# Patient Record
Sex: MALE | Race: WHITE
[De-identification: names, ages, dates, MRNs, and addresses within clinical notes are randomized per-mention and may not be internally consistent; named-entity substitution may affect disease eponyms.]

---

## 2021-01-11 ENCOUNTER — HOSPITAL ENCOUNTER (EMERGENCY)
Dept: HOSPITAL 7 - FB.ED | Age: 21
Discharge: HOME | End: 2021-01-11
Payer: COMMERCIAL
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
## 2021-01-11 DIAGNOSIS — S61.412A: Primary | ICD-10-CM

## 2021-01-11 PROCEDURE — 99282 EMERGENCY DEPT VISIT SF MDM: CPT

## 2021-01-11 PROCEDURE — 12001 RPR S/N/AX/GEN/TRNK 2.5CM/<: CPT

## 2021-01-11 NOTE — EDM.PDOC
ED HPI GENERAL MEDICAL PROBLEM





- General


Chief Complaint: Laceration


Stated Complaint: CUT FINGER


Time Seen by Provider: 01/11/21 17:40


Source of Information: Reports: Patient


History Limitations: Reports: No Limitations





- History of Present Illness


INITIAL COMMENTS - FREE TEXT/NARRATIVE: 





c/o hand lac





in his dorm, cutting twist tie with pocket knife, struck L hand





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 





from [employer]


  ** Left Finger-Index


Pain Score (Numeric/FACES): 3





- Related Data


                                    Allergies











Allergy/AdvReac Type Severity Reaction Status Date / Time


 


No Known Allergies Allergy   Verified 01/11/21 17:34











Home Meds: 


                                    Home Meds





cephALEXin [Cephalexin] 500 mg PO BID #6 tablet 01/11/21 [Rx]











Past Medical History





- Past Health History


Medical/Surgical History: Denies Medical/Surgical History





- Past Surgical History


HEENT Surgical History: Reports: Oral Surgery


Other HEENT Surgeries/Procedures: wisdom teeth removed.





Social & Family History





- Tobacco Use


Tobacco Use Status *Q: Never Tobacco User





- Caffeine Use


Caffeine Use: Reports: Soda





- Recreational Drug Use


Recreational Drug Use: No





ED ROS GENERAL





- Review of Systems


Review Of Systems: See Below


Constitutional: Reports: No Symptoms


HEENT: Reports: No Symptoms


Respiratory: Reports: No Symptoms


Cardiovascular: Reports: No Symptoms


Endocrine: Reports: No Symptoms


GI/Abdominal: Reports: No Symptoms


: Reports: No Symptoms


Musculoskeletal: Reports: No Symptoms


Skin: Reports: Wound


Neurological: Reports: No Symptoms


Psychiatric: Reports: No Symptoms


Hematologic/Lymphatic: Reports: No Symptoms


Immunologic: Reports: No Symptoms





ED EXAM, SKIN/RASH


Exam: See Below


Exam Limited By: No Limitations


General Appearance: Alert, WD/WN, No Apparent Distress


Head: Atraumatic, Normocephalic


Neck: Normal Inspection, Supple, Non-Tender, Full Range of Motion.  No: 

Lymphadenopathy (R), Lymphadenopathy (L)


Respiratory/Chest: No Respiratory Distress, Lungs Clear, Normal Breath Sounds, 

Chest Non-Tender


Cardiovascular: Regular Rate, Rhythm, No Edema, No Murmur


GI/Abdominal: Soft, Non-Tender


Back Exam: Normal Inspection, Full Range of Motion.  No: CVA Tenderness (L)


Neurological: Alert, Oriented, CN II-XII Intact, Normal Cognition, Normal Gait, 

No Motor/Sensory Deficits


Psychiatric: Normal Affect, Normal Mood


Skin: Warm, Dry, Other (L hand with superficial 1.3 cm lac over 2nd MCP, FROM, 

sensation intact, full thickness, no apparent injury to joint capsule, 1% lido 

with epi with #30 needle local, 3-0 Prolene x 4 closure after clean x 12 with 

gauze and NS, good analgesia, good apposition of margins, no tendons injured)





Course





- Vital Signs


Last Recorded V/S: 





                                Last Vital Signs











Temp  36.7 C   01/11/21 17:34


 


Pulse  76   01/11/21 17:34


 


Resp  18   01/11/21 17:34


 


BP  151/87 H  01/11/21 17:34


 


Pulse Ox  100   01/11/21 17:34














- Orders/Labs/Meds


Orders: 





                               Active Orders 24 hr











 Category Date Time Status


 


 cephALEXin [Keflex] Med  01/11/21 18:23 Once





 500 mg PO ONETIME ONE   














- Re-Assessments/Exams


Free Text/Narrative Re-Assessment/Exam: 





01/11/21 18:35


lac appeared angulated and just FT, pt cautioned that ortho would need to 

irrigate joint if signs of inf occurred, which is unlikely, pt agreed to follow 

instructions and monitor closely





Departure





- Departure


Time of Disposition: 18:24


Disposition: Home, Self-Care 01


Condition: Good


Clinical Impression: 


 Laceration of left hand








- Discharge Information


*PRESCRIPTION DRUG MONITORING PROGRAM REVIEWED*: Not Applicable


*COPY OF PRESCRIPTION DRUG MONITORING REPORT IN PATIENT [facility]AYA: Not Applicable


Prescriptions: 


cephALEXin [Cephalexin] 500 mg PO BID #6 tablet


Instructions:  Sutured Wound Care


Referrals: 


PCP,None [Primary Care Provider] - 


Additional Instructions: 


The risk of infection is low. However, see a physician the same day for any 

increase in redness, swelling, pain, warmth, fever or drainage.





As a precaution, take cephalexin 500 mg 1 tab 2 times a day for 3 days.





Keep covered with a bandaid. May shower after 24 hours, however avoid getting 

bandaid wet. Change bandaid after shower.





Avoid bending knuckle as much as possible to allow the skin to heal.





Go to [facility] in 7 days to remove sutures.





Sepsis Event Note (ED)





- Evaluation


Sepsis Screening Result: No Definite Risk





- Focused Exam


Vital Signs: 





                                   Vital Signs











  Temp Pulse Resp BP Pulse Ox


 


 01/11/21 17:34  36.7 C  76  18  151/87 H  100














- My Orders


Last 24 Hours: 





My Active Orders





01/11/21 18:23


cephALEXin [Keflex]   500 mg PO ONETIME ONE 














- Assessment/Plan


Last 24 Hours: 





My Active Orders





01/11/21 18:23


cephALEXin [Keflex]   500 mg PO ONETIME ONE